# Patient Record
Sex: MALE | Race: AMERICAN INDIAN OR ALASKA NATIVE
[De-identification: names, ages, dates, MRNs, and addresses within clinical notes are randomized per-mention and may not be internally consistent; named-entity substitution may affect disease eponyms.]

---

## 2018-03-12 ENCOUNTER — HOSPITAL ENCOUNTER (EMERGENCY)
Dept: HOSPITAL 5 - ED | Age: 43
Discharge: HOME | End: 2018-03-12
Payer: SELF-PAY

## 2018-03-12 VITALS — SYSTOLIC BLOOD PRESSURE: 135 MMHG | DIASTOLIC BLOOD PRESSURE: 87 MMHG

## 2018-03-12 DIAGNOSIS — Z88.1: ICD-10-CM

## 2018-03-12 DIAGNOSIS — M54.32: Primary | ICD-10-CM

## 2018-03-12 DIAGNOSIS — Z88.2: ICD-10-CM

## 2018-03-12 PROCEDURE — 99282 EMERGENCY DEPT VISIT SF MDM: CPT

## 2018-03-12 PROCEDURE — 96372 THER/PROPH/DIAG INJ SC/IM: CPT

## 2018-03-12 NOTE — EMERGENCY DEPARTMENT REPORT
Chief Complaint: Extremity Injury, Lower


Stated Complaint: LEG PAIN


Time Seen by Provider: 03/12/18 17:06





- HPI


History of Present Illness: 





The patient is a 42-year-old male who presents for left posterior buttocks pain 

radiating into the distal lower leg for the past 4 days. The patient denies 

blunt trauma to the back, fall, fever, chills, night sweats, saddle anesthesia, 

paresthesias, numbness or tingling in the legs, leg weakness, urine or bowel 

incontinence or retention, difficulty ambulating, or other focal neurological 

deficits. The patient also denies redness or swelling to the back, IV drug use, 

history of cancer.





- Exam


Vital Signs: 


 Vital Signs











  03/12/18





  13:59


 


Temperature 98.5 F


 


Pulse Rate 119 H


 


Respiratory 18





Rate 


 


Blood Pressure 135/87


 


O2 Sat by Pulse 96





Oximetry 











MSE screening note: 


Focused history and physical exam performed.


Due to findings the following was ordered:











ED Disposition for MSE


Condition: Stable

## 2018-03-12 NOTE — EMERGENCY DEPARTMENT REPORT
ED Extremity Problem HPI





- General


Chief complaint: Extremity Injury, Lower


Stated complaint: LEG PAIN


Time Seen by Provider: 18 17:06


Source: patient


Mode of arrival: Ambulatory


Limitations: No Limitations





- History of Present Illness


Initial comments: 


42-year-old male past medical history HIV presents with complaint of left-sided 

leg pain complaining of pain in left buttock radiating to left thigh.  Denies 

nausea vomiting fever chills dysuria.  Patient is ambulatory without 

assistance.  Denies any trauma to leg.  Denies any rash.  Patient was screened 

and medically evaluated by Dr. Romeo before I took over case.  Patient 

denies any saddle paresthesias bladder or bowel incontinence.  Patient is 

ambulatory without assistance.


MD Complaint: extremity pain


Onset/Timin


-: week(s)


Location: left, lower extremity


History of Same: No


-: Yes myalgia


Radiation: proximal


Severity scale (0 -10): 5


Quality: aching


Consistency: constant


Improves with: nothing


Worsens with: nothing


Associated Symptoms: denies other symptoms





- Related Data


 Previous Rx's











 Medication  Instructions  Recorded  Last Taken  Type


 


Acetaminophen/Codeine [Tylenol 1 tab PO Q6H PRN #10 tab 18 Unknown Rx





/Codeine # 3 tab]    


 


Naproxen 500 mg PO BID PRN #20 tablet 18 Unknown Rx











 Allergies











Allergy/AdvReac Type Severity Reaction Status Date / Time


 


sulfamethoxazole Allergy  Unknown Verified 18 13:59





[From Bactrim]     


 


trimethoprim [From Bactrim] Allergy  Unknown Verified 18 13:59














ED Review of Systems


ROS: 


Stated complaint: LEG PAIN


Other details as noted in HPI








ED Past Medical Hx





- Past Medical History


Previous Medical History?: Yes


Hx HIV: Yes


Additional medical history: lung disease





- Surgical History


Past Surgical History?: Yes


Hx Appendectomy: Yes


Additional Surgical History: Breast reduction





- Social History


Smoking Status: Never Smoker


Substance Use Type: Prescribed





- Medications


Home Medications: 


 Home Medications











 Medication  Instructions  Recorded  Confirmed  Last Taken  Type


 


Acetaminophen/Codeine [Tylenol 1 tab PO Q6H PRN #10 tab 18  Unknown Rx





/Codeine # 3 tab]     


 


Naproxen 500 mg PO BID PRN #20 tablet 18  Unknown Rx














ED Physical Exam





- General


Limitations: No Limitations


General appearance: alert, in no apparent distress





- Head


Head exam: Present: atraumatic, normocephalic





- Eye


Eye exam: Present: normal appearance





- ENT


ENT exam: Present: mucous membranes moist





- Neck


Neck exam: Present: normal inspection





- Respiratory


Respiratory exam: Present: normal lung sounds bilaterally.  Absent: respiratory 

distress





- Cardiovascular


Cardiovascular Exam: Present: regular rate, normal rhythm.  Absent: systolic 

murmur, diastolic murmur, rubs, gallop





- GI/Abdominal


GI/Abdominal exam: Present: soft, normal bowel sounds





- Rectal


Rectal exam: Present: deferred





- Extremities Exam


Extremities exam: Present: normal inspection





- Back Exam


Back exam: Present: normal inspection





- Neurological Exam


Neurological exam: Present: alert, oriented X3





- Psychiatric


Psychiatric exam: Present: normal affect, normal mood





- Skin


Skin exam: Present: warm, dry, intact, normal color.  Absent: rash





ED Course





 Vital Signs











  18





  13:59


 


Temperature 98.5 F


 


Pulse Rate 119 H


 


Respiratory 18





Rate 


 


Blood Pressure 135/87


 


O2 Sat by Pulse 96





Oximetry 














ED Medical Decision Making





- Medical Decision Making


A/P: Left-sided sciatica


1-naproxen, short course codeine


2-patient examined Ambulatory


3-patient seen by Dr. Romeo


4- no clinical signs of myositis at this time or compartment syndrome


Critical care attestation.: 


If time is entered above; I have spent that time in minutes in the direct care 

of this critically ill patient, excluding procedure time.








ED Disposition


Clinical Impression: 


 Sciatica, left side





Disposition: DC-01 TO HOME OR SELFCARE


Is pt being admited?: No


Does the pt Need Aspirin: No


Condition: Stable


Instructions:  Sciatica (ED)


Prescriptions: 


Acetaminophen/Codeine [Tylenol /Codeine # 3 tab] 1 tab PO Q6H PRN #10 tab


 PRN Reason: Pain


Naproxen 500 mg PO BID PRN #20 tablet


 PRN Reason: Pain


Referrals: 


[facility] [Provider Group] - 3-5 Days


Time of Disposition: 17:55